# Patient Record
Sex: MALE | Race: BLACK OR AFRICAN AMERICAN | Employment: FULL TIME | URBAN - METROPOLITAN AREA
[De-identification: names, ages, dates, MRNs, and addresses within clinical notes are randomized per-mention and may not be internally consistent; named-entity substitution may affect disease eponyms.]

---

## 2022-12-25 RX ORDER — EPINEPHRINE 0.1 MG/ML
1 INJECTION INTRACARDIAC; INTRAVENOUS
Status: CANCELLED | OUTPATIENT
Start: 2022-12-25 | End: 2022-12-26

## 2022-12-25 RX ORDER — SODIUM CHLORIDE 0.9 % (FLUSH) 0.9 %
5-40 SYRINGE (ML) INJECTION EVERY 8 HOURS
Status: CANCELLED | OUTPATIENT
Start: 2022-12-25

## 2022-12-25 RX ORDER — DIPHENHYDRAMINE HYDROCHLORIDE 50 MG/ML
50 INJECTION, SOLUTION INTRAMUSCULAR; INTRAVENOUS ONCE
Status: CANCELLED | OUTPATIENT
Start: 2022-12-25 | End: 2022-12-25

## 2022-12-25 RX ORDER — SODIUM CHLORIDE 0.9 % (FLUSH) 0.9 %
5-40 SYRINGE (ML) INJECTION AS NEEDED
Status: CANCELLED | OUTPATIENT
Start: 2022-12-25

## 2022-12-25 RX ORDER — DEXTROMETHORPHAN/PSEUDOEPHED 2.5-7.5/.8
1.2 DROPS ORAL
Status: CANCELLED | OUTPATIENT
Start: 2022-12-25

## 2022-12-25 RX ORDER — ATROPINE SULFATE 0.1 MG/ML
0.5 INJECTION INTRAVENOUS
Status: CANCELLED | OUTPATIENT
Start: 2022-12-25 | End: 2022-12-26

## 2023-01-02 NOTE — H&P
Assessment/Plan  # Detail Type Description    1. Assessment Encounter for screening for cancer of colon (Z12.11). Impression Pt of Dr. Linda Rascon, here for 10yr Recall, for screening colonoscopy.   _________________________________  *Last EGD & colonoscopy (1st exams - FLIP) were done on 12/21/2010 By Dr. Augie Egan @[facility]:     *EGD Impression: Normal Upper Endoscopy. *C-scope Impression: Mild diverticulosis in the sigmoid colon. Otherwise, Negative exam, 10yr Recall recommended.   _________________________________  Average risk, no FHx of colon cancer. Asymptomatic of GI complaints. BMI: 27.05(Very Tall Frame) , BM: 1/day. *PM/SH: HTN, HLD, Thrombocytopenia, Vertigo, GERD, LBP, Elevated PSA. No reported hx of abdominal surgeries, strokes, or CAD. Patient Plan *C-scope Plan:  Colonoscopy ordered with Dr. Macon Kocher with Miralax bowel prep, and Mag Citrate 2 days before prep, and Miralax and stool softeners starting 3 days before prep.  -Patient is advised that they should take their aspirin (if prescribed) up until the day of procedure.  -Patient is advised to take Thyroid meds, BP meds, beta blockers, and any cardiac meds the AM of procedure with sip clear liquid after prep. *C-scope Risks:  Stressed importance of following all bowel preparation instructions. Explained the procedure to the patient including all risks and benefits. These risks consist of missed lesions on exam, bleeding, and bowel perforation with possible need for admission to the hospital, and in the most extensive of  circumstances, the patient may require surgery. Pt verbalized understanding of these risks and is agreeable with this procedure. Plan Orders Further diagnostic evaluations ordered today include(s) DIAGNOSTIC COLONOSCOPY to be performed. He will be scheduled for GASTROENTEROLOGY PROCEDURE, Next Lab Date is within 6 month on 01/03/2023.  Clinical information/comments: at location [facility] surgeon scheduled is Antonia Ford MD. An assistant has not been requested. This 57year old  patient was referred by Shawna Vincent. This 57year old male presents for Colon Cancer Screening. History of Present Illness  1. Colon Cancer Screening   Prior screening:  colonoscopy. Denies risk factors. Pertinent negatives include abdominal pain, change in bowel habits, change in stool caliber, constipation, decreased appetite, diarrhea, melena, nausea, rectal bleeding, vomiting, weight gain and weight loss. Additional information: No family history of colon cancer and BM 1x daily. Comments: *Last EGD & colonoscopy (1st exams - FLIP) were done on 2010 By Dr. Keena Galeas @[facility] [place]:     *EGD Impression: Normal Upper Endoscopy. *C-scope Impression: Mild diverticulosis in the sigmoid colon. Otherwise, Negative exam, 10yr Recall recommended. Problem List  Problem Description Onset Date Chronic Clinical Status Notes   Elevated PSA  Y     Hypertension  Y     Erectile dysfunction  Y     Screening for malignant neoplasm of colon done 2022 N     Immune thrombocytopenia 2022 N     Gastroesophageal reflux in child 2022 N       Past Medical/Surgical History   (Detailed)  Disease/disorder Onset Date Management Date Comments   Hypertension       Hyperlipidemia         Herniated disc           Family History   (Detailed)    Relationship Family Member Name  Age at Death Condition Onset Age Cause of Death   Father  Y  Cancer  Y   Father  Y    N   Mother    Cancer, breast  N     Social History  (Detailed)  Tobacco use reviewed. Preferred language is [place]. The patient does not need an .       Education/Employment/Occupation  Employment History Status Retired Restrictions            Marital Status/Family/Social Support  Marital status: Single     Tobacco use status: Ex-cigarette smoker. Smoking status: Former smoker. Tobacco Screening  Patient has used tobacco. Patient has used tobacco in the last 30 days. Smoking Status  Type Smoking Status Usage Per Day Years Used Pack Years Total Pack Years   Cigarette Former smoker 0.5 Packs        Tobacco/Vaping Exposure  No passive smoke exposure. Alcohol  There is a history of alcohol use. Type: Beer and liquor. consumed rarely. Last alcoholic drink was last night. Caffeine  The patient uses caffeine: coffee. Lifestyle  Moderate activity level. Medications (active prior to today)  Medication Instructions Start Date Stop Date Refilled Elsewhere   Vitamin D3  //   Y   sildenafil 100 mg tablet take 1 tablet by oral route  every day as needed approximately 1 hour before sexual activity 06/29/2021 06/29/2021 N   atorvastatin 20 mg tablet TAKE 1 TABLET BY MOUTH ONCE DAILY 03/30/2022 03/30/2022 N   lisinopril 10 mg tablet TAKE 1 TABLET BY MOUTH ONCE DAILY 03/30/2022 03/30/2022 N   amlodipine 5 mg tablet Take 1 tablet by mouth once daily 03/31/2022 03/31/2022 N   hydrocodone 5 mg-acetaminophen 325 mg tablet take 1 tablet by oral route  every 12 hours as needed for pain 07/11/2022 07/11/2022 N       Medication Reconciliation  Medications reconciled today.     Medication Reviewed  Adherence Medication Name Sig Desc Elsewhere Status   taking as directed Vitamin D3  Y Verified   taking as directed sildenafil 100 mg tablet take 1 tablet by oral route  every day as needed approximately 1 hour before sexual activity N Verified   taking as directed lisinopril 10 mg tablet TAKE 1 TABLET BY MOUTH ONCE DAILY N Verified   taking as directed atorvastatin 20 mg tablet TAKE 1 TABLET BY MOUTH ONCE DAILY N Verified   taking as directed amlodipine 5 mg tablet Take 1 tablet by mouth once daily N Verified   taking as directed hydrocodone 5 mg-acetaminophen 325 mg tablet take 1 tablet by oral route  every 12 hours as needed for pain N Verified Medications (Added, Continued or Stopped today)  Start Date Medication Directions PRN Status PRN Reason Instruction Stop Date   03/31/2022 amlodipine 5 mg tablet Take 1 tablet by mouth once daily N      03/30/2022 atorvastatin 20 mg tablet TAKE 1 TABLET BY MOUTH ONCE DAILY N      07/11/2022 hydrocodone 5 mg-acetaminophen 325 mg tablet take 1 tablet by oral route  every 12 hours as needed for pain N      03/30/2022 lisinopril 10 mg tablet TAKE 1 TABLET BY MOUTH ONCE DAILY N      06/29/2021 sildenafil 100 mg tablet take 1 tablet by oral route  every day as needed approximately 1 hour before sexual activity N       Vitamin D3  N        Allergies  Ingredient Reaction (Severity) Medication Name Comment   NO KNOWN ALLERGIES        Reviewed, no changes. Review of Systems  System Neg/Pos Details   Constitutional Negative Fever, Weight gain and Weight loss. ENMT Negative Sinus Infection. Eyes Negative Double vision. Respiratory Negative Asthma, Chronic cough and Dyspnea. Cardio Negative Chest pain, Edema and Irregular heartbeat/palpitations. GI Negative Abdominal pain, Change in bowel habits, Change in stool caliber, Constipation, Decreased appetite, Diarrhea, Dysphagia, Heartburn, Hematemesis, Hematochezia, Melena, Nausea, Rectal bleeding, Reflux and Vomiting.  Negative Dysuria and Hematuria. Endocrine Negative Cold intolerance and Heat intolerance. Neuro Negative Dizziness, Headache, Numbness and Tremors. Psych Negative Anxiety, Depression and Increased stress. Integumentary Negative Hives, Pruritus and Rash. MS Negative Back pain, Joint pain and Myalgia. Hema/Lymph Negative Easy bleeding, Easy bruising and Lymphadenopathy. Allergic/Immuno Negative Food allergies and Immunosuppression.        Vital Signs   Height  Time ft in cm Last Measured Height Position   2:36 PM 6.0 1.00 185.42 03/30/2022 Standing     Weight/BSA/BMI  Time lb oz kg Context BMI kg/m2 BSA m2   2:36 .00  92.986 dressed with shoes 27.05      Date/Time Temp Pulse BP Cardiac Rhythm Who   01/03/23 0810 98.5 °F (36.9 °C) 60 139/88 -- AS     Respiratory Therapy (last day)    Date/Time Resp SpO2 O2 Device O2 Flow Rate (L/min) Who   01/03/23 0810 14 100 % None (Room air) -- AS     Physical  Exam  Exam Findings Details   Male GI Quick Exam Comments Very Tall Frame. Constitutional Normal Well developed. Eyes Normal Conjunctiva - Right: Normal, Left: Normal. Sclera - Right: Normal, Left: Normal.   Nasopharynx Normal Lips/teeth/gums - Normal.   Neck Exam Normal Inspection - Normal.   Respiratory Normal Inspection - Normal.   Cardiovascular Normal Regular rate and rhythm. No murmurs, gallops, or rubs. Vascular Normal Pulses - Brachial: Normal.   Skin Normal Inspection - Normal.   Musculoskeletal Normal Hands/Wrist - Right: Normal, Left: Normal.   Extremity Normal No edema. Neurological Normal Fine motor skills - Normal.   Psychiatric Normal Orientation - Oriented to time, place, person & situation. Appropriate mood and affect. Patient Education  # Patient Education   1.  Colon Cancer Screening: Care Instructions       Immunizations Entered by History  Date Immunization   6/3/2021 12:00:00 AM SARS-COV-2 (COVID-19) vaccine, mRNA, spike protein, LNP, preservative free, 30 mcg/0.3mL dose, garrett-sucrose formulation   5/14/2021 12:00:00 AM SARS-COV-2 (COVID-19) vaccine, mRNA, spike protein, LNP, preservative free, 30 mcg/0.3mL dose       Active Patient Care Team Members  Name Contact Agency Type Support Role Relationship Active Date Inactive Date Specialty   Nahomi Floyd   encounter provider       Mj Israel   Patient provider PCP   Family Practice   Mj Israel   primary care provider       Jyoti Ramesh   encounter provider    Gastroenterology   [facility]   encounter provider    Gastroenterology   Patient questioned and examined

## 2023-01-03 ENCOUNTER — HOSPITAL ENCOUNTER (OUTPATIENT)
Age: 63
Setting detail: OUTPATIENT SURGERY
Discharge: HOME | End: 2023-01-03
Attending: INTERNAL MEDICINE | Admitting: INTERNAL MEDICINE
Payer: COMMERCIAL

## 2023-01-03 VITALS
TEMPERATURE: 97.9 F | WEIGHT: 208.3 LBS | HEART RATE: 53 BPM | BODY MASS INDEX: 27.61 KG/M2 | RESPIRATION RATE: 18 BRPM | HEIGHT: 73 IN | OXYGEN SATURATION: 100 % | SYSTOLIC BLOOD PRESSURE: 119 MMHG | DIASTOLIC BLOOD PRESSURE: 74 MMHG

## 2023-01-03 PROCEDURE — 77030040361 HC SLV COMPR DVT MDII -B: Performed by: INTERNAL MEDICINE

## 2023-01-03 PROCEDURE — 76040000007: Performed by: INTERNAL MEDICINE

## 2023-01-03 PROCEDURE — 99153 MOD SED SAME PHYS/QHP EA: CPT | Performed by: INTERNAL MEDICINE

## 2023-01-03 PROCEDURE — 74011250636 HC RX REV CODE- 250/636: Performed by: INTERNAL MEDICINE

## 2023-01-03 PROCEDURE — 2709999900 HC NON-CHARGEABLE SUPPLY: Performed by: INTERNAL MEDICINE

## 2023-01-03 PROCEDURE — 88305 TISSUE EXAM BY PATHOLOGIST: CPT

## 2023-01-03 PROCEDURE — 77030021593 HC FCPS BIOP ENDOSC BSC -A: Performed by: INTERNAL MEDICINE

## 2023-01-03 PROCEDURE — G0500 MOD SEDAT ENDO SERVICE >5YRS: HCPCS | Performed by: INTERNAL MEDICINE

## 2023-01-03 RX ORDER — LISINOPRIL 5 MG/1
5 TABLET ORAL DAILY
COMMUNITY

## 2023-01-03 RX ORDER — AMLODIPINE BESYLATE 5 MG/1
5 TABLET ORAL DAILY
COMMUNITY

## 2023-01-03 RX ORDER — FENTANYL CITRATE 50 UG/ML
100 INJECTION, SOLUTION INTRAMUSCULAR; INTRAVENOUS
Status: DISCONTINUED | OUTPATIENT
Start: 2023-01-03 | End: 2023-01-03 | Stop reason: HOSPADM

## 2023-01-03 RX ORDER — ATORVASTATIN CALCIUM 10 MG/1
5 TABLET, FILM COATED ORAL DAILY
COMMUNITY

## 2023-01-03 RX ORDER — MIDAZOLAM HYDROCHLORIDE 1 MG/ML
.25-5 INJECTION, SOLUTION INTRAMUSCULAR; INTRAVENOUS
Status: DISCONTINUED | OUTPATIENT
Start: 2023-01-03 | End: 2023-01-03 | Stop reason: HOSPADM

## 2023-01-03 RX ORDER — SODIUM CHLORIDE 9 MG/ML
1000 INJECTION, SOLUTION INTRAVENOUS CONTINUOUS
Status: DISCONTINUED | OUTPATIENT
Start: 2023-01-03 | End: 2023-01-03 | Stop reason: HOSPADM

## 2023-01-03 RX ORDER — NALOXONE HYDROCHLORIDE 0.4 MG/ML
0.4 INJECTION, SOLUTION INTRAMUSCULAR; INTRAVENOUS; SUBCUTANEOUS
Status: DISCONTINUED | OUTPATIENT
Start: 2023-01-03 | End: 2023-01-03 | Stop reason: HOSPADM

## 2023-01-03 RX ORDER — FLUMAZENIL 0.1 MG/ML
0.2 INJECTION INTRAVENOUS
Status: DISCONTINUED | OUTPATIENT
Start: 2023-01-03 | End: 2023-01-03 | Stop reason: HOSPADM

## 2023-01-03 RX ORDER — HYDROCODONE BITARTRATE AND ACETAMINOPHEN 5; 325 MG/1; MG/1
1 TABLET ORAL AS NEEDED
COMMUNITY
Start: 2022-12-26

## 2023-01-03 RX ADMIN — SODIUM CHLORIDE 1000 ML: 9 INJECTION, SOLUTION INTRAVENOUS at 08:13

## 2023-01-03 NOTE — PROCEDURES
[facility]  Colonoscopy Procedure Report  _______________________________________________________  Patient: Emanuel Christie                                        Attending Physician: Vickie Leon MD    Patient ID: [id number]                                    Referring Physician: Anna Gomez MD    Exam Date: 1/3/2023      Introduction: A  60 y.o. male patient, presents for inpatient Colonoscopy    Indications: Screening for colon cancer average risk and asymptomatic. Pt of Dr. Angella Omalley, Last EGD & colonoscopy (1st exams - FLIP) were done on 12/21/2010 By Dr. Jodee Gill @[facility]:   EGD Impression: Normal Upper Endoscopy. *C-scope Impression: Mild diverticulosis in the sigmoid colon. Otherwise, Negative exam, 10yr Recall recommended. No FHx of colon cancer. Asymptomatic of GI complaints. BMI: 27.05(Very Tall Frame) , BM: 1/day. PM/SH: HTN, HLD, Thrombocytopenia, Vertigo, GERD, LBP, Elevated PSA. No reported hx of abdominal surgeries, strokes, or CAD    Consent: The benefits, risks, and alternatives to the procedure were discussed and informed consent was obtained from the patient. Preparation: EKG, pulse, pulse oximetry and blood pressure were monitored throughout the procedure. ASA Classification: Class II- . The heart is an S1-S2 and regular heart rate and rhythm. Lungs are clear to auscultation and percussion. Abdomen is soft, nondistended, and nontender. Mental Status: awake, alert, and oriented to person, place, and time    Medications:  Fentanyl 100 mcg IV before procedure. Versed 5 mg IV throughout the procedure. Rectal Exam: Normal Rectal Exam. No Blood. Prostate not enlarged. Pathology Specimens:  1    Procedure: The colonoscope was passed with ease through the anus under direct visualization and advanced to the cecum and 5 cm inside the terminal ileum. Retroflexion is made in the ascending colon.   The scope was withdrawn and the mucosa was carefully examined. The quality of the preparation was excellent. The views were excellent. The patient's toleration of the procedure was excellent. The exam was done twice to the cecum. Total time is 13 minutes and withdrawal time is 19 minutes. Findings:    Rectum:   Small internal hemorrhoids. Sigmoid:    Slightly tortuous sigmoid colon with moderate sigmoid diverticulosis. Rommel Hilt Descending Colon:   Normal   Transverse Colon:   Normal   Ascending Colon:   Normal  Cecum:   2 mm polyp in the cecum on a fold, removed with cold forceps. Terminal Ileum:   Normal.       Unplanned Events: There were no unplanned events. Estimated Blood Loss: None  IMPLANTS: * No implants in log *  Impressions: Small internal hemorrhoids. Slightly tortuous sigmoid colon with moderate sigmoid diverticulosis. 2 mm polyp in the cecum on a fold, removed with cold forceps. Normal Mucosa. No blood, polyps or AVM found. Complications: None; patient tolerated the procedure well. Recommendations:  Discharge home when standard parameters are met. Resume a high fiber diet. Resume own medications. Avoid all NSAID's for 3 days  Colonoscopy recommendation in 10 years.   Take Miralax and/ or Colace 100 mg on regular basis if constipated    Procedure Codes:    Juan Tay [[id number]]    Endoscope Information:  Model Number(s)    [id number]   Assistant: None  Signed By: Anastacia Lee MD Date: 1/3/2023

## 2023-01-03 NOTE — DISCHARGE INSTRUCTIONS
Shyann Viera  [id number]  1960    COLON DISCHARGE INSTRUCTIONS    Discomfort:  Redness at IV site- apply warm compress to area; if redness or soreness persist- contact your physician  There may be a slight amount of blood passed from the rectum  Gaseous discomfort- walking, belching will help relieve any discomfort  You may not operate a vehicle til the next day. You may not engage in an occupation involving machinery or appliances til the next day. You may not drink alcoholic beverages til the next day. DIET:   High fiber diet. ACTIVITY:  You may not  resume your normal daily activities til the next day. it is recommended that you spend the remainder of the day resting -  avoid any strenuous activity. CALL M.D.  IF ANY SIGN OF:   Increasing pain, nausea, vomiting  Abdominal distension (swelling)  New increased bleeding (oral or rectal)  Fever (chills)  Pain in chest area  Bloody discharge from nose or mouth  Shortness of breath    You may not  take any Advil, Aspirin, Ibuprofen, Motrin, Aleve, or Goodys for 3 days, ONLY  Tylenol as needed for pain. Post procedure diagnosis:  INTERNAL HEMORRHOIDS; SIGMOID DIVERTICULOSIS; POLYP    Follow-up Instructions: Your follow up colonoscopy will be in 10 years. We will notify you the results of your biopsy by letter within 2 weeks. Nicholas Rick MD  January 3, 2023       DISCHARGE SUMMARY from Nurse    PATIENT INSTRUCTIONS:    After general anesthesia or intravenous sedation, for 24 hours or while taking prescription Narcotics:  Limit your activities  Do not drive and operate hazardous machinery  Do not make important personal or business decisions  Do  not drink alcoholic beverages  If you have not urinated within 8 hours after discharge, please contact your surgeon on call.     Report the following to your surgeon:  Excessive pain, swelling, redness or odor of or around the surgical area  Temperature over 100.5  Nausea and vomiting lasting longer than 4 hours or if unable to take medications  Any signs of decreased circulation or nerve impairment to extremity: change in color, persistent  numbness, tingling, coldness or increase pain  Any questions    What to do at Home:  Recommended activity: as above,     If you experience any of the following symptoms as above, please follow up with Dr. Art Philip. *  Please give a list of your current medications to your Primary Care Provider. *  Please update this list whenever your medications are discontinued, doses are      changed, or new medications (including over-the-counter products) are added. *  Please carry medication information at all times in case of emergency situations. These are general instructions for a healthy lifestyle:    No smoking/ No tobacco products/ Avoid exposure to second hand smoke  Surgeon General's Warning:  Quitting smoking now greatly reduces serious risk to your health. Obesity, smoking, and sedentary lifestyle greatly increases your risk for illness    A healthy diet, regular physical exercise & weight monitoring are important for maintaining a healthy lifestyle    You may be retaining fluid if you have a history of heart failure or if you experience any of the following symptoms:  Weight gain of 3 pounds or more overnight or 5 pounds in a week, increased swelling in our hands or feet or shortness of breath while lying flat in bed. Please call your doctor as soon as you notice any of these symptoms; do not wait until your next office visit. The discharge information has been reviewed with the patient and spouse. The patient and spouse verbalized understanding. Discharge medications reviewed with the patient and spouse and appropriate educational materials and side effects teaching were provided.   ___________________________________________________________________________________________________________________________________  Patient armband removed and shredded

## (undated) DEVICE — SYRINGE 50ML E/T

## (undated) DEVICE — KENDALL RADIOLUCENT FOAM MONITORING ELECTRODE RECTANGULAR SHAPE: Brand: KENDALL

## (undated) DEVICE — SYR 5ML 1/5 GRAD LL NSAF LF --

## (undated) DEVICE — Z INACTIVE USE 2854097 SPONGE GZ W4XL4IN COT 12 PLY TYP VII WVN C FLD DSGN

## (undated) DEVICE — CATHETER PH SUCT 14FR

## (undated) DEVICE — SOLUTION IV 500ML 0.9% SOD CHL FLX CONT

## (undated) DEVICE — SYRINGE MED 3ML CLR PLAS STD N CTRL LUERLOCK TIP DISP

## (undated) DEVICE — NDL FLTR TIP 5 MIC 18GX1.5IN --

## (undated) DEVICE — TUBING, SUCTION, 1/4" X 12', STRAIGHT: Brand: MEDLINE

## (undated) DEVICE — FORCEPS BX CAP 240CM L RAD JAW 4

## (undated) DEVICE — NDL PRT INJ NSAF BLNT 18GX1.5 --

## (undated) DEVICE — SPONGE GZ W4XL4IN RAYON POLY 4 PLY NONWOVEN FASTER WICKING

## (undated) DEVICE — SINGLE PORT MANIFOLD: Brand: NEPTUNE 2

## (undated) DEVICE — GARMENT,MEDLINE,DVT,INT,CALF,MED, GEN2: Brand: MEDLINE

## (undated) DEVICE — CANNULA CUSH AD W/ 14FT TBG

## (undated) DEVICE — SET ADMIN 16ML TBNG L100IN 2 Y INJ SITE IV PIGGY BK DISP (ORDER IN MULIPLES OF 48)

## (undated) DEVICE — Device

## (undated) DEVICE — MAJ-1414 SINGLE USE ADPATER BIOPSY VALV: Brand: SINGLE USE ADAPTOR BIOPSY VALVE

## (undated) DEVICE — CATHETER IV 22GA L1IN BLU POLYUR STR HUB RADPQ PROTCT +

## (undated) DEVICE — TRAP SPEC POLYP REM STRNR CLN DSGN MAGNIFYING WIND DISP

## (undated) DEVICE — TOURNIQUET PHLEB W1XL18IN BLU FLAT RL AND BND REUSE FOR IV

## (undated) DEVICE — WRISTBAND ID AD W2.5XL9.5CM RED VYN ADH CLSR UNI-PRINT